# Patient Record
Sex: FEMALE
[De-identification: names, ages, dates, MRNs, and addresses within clinical notes are randomized per-mention and may not be internally consistent; named-entity substitution may affect disease eponyms.]

---

## 2018-01-03 ENCOUNTER — HOSPITAL ENCOUNTER (OUTPATIENT)
Dept: HOSPITAL 92 - MRI | Age: 63
Discharge: HOME | End: 2018-01-03
Payer: MEDICARE

## 2018-01-03 DIAGNOSIS — M47.896: ICD-10-CM

## 2018-01-03 DIAGNOSIS — H53.9: ICD-10-CM

## 2018-01-03 DIAGNOSIS — M50.30: ICD-10-CM

## 2018-01-03 DIAGNOSIS — G93.89: ICD-10-CM

## 2018-01-03 DIAGNOSIS — R51: ICD-10-CM

## 2018-01-03 DIAGNOSIS — M54.5: Primary | ICD-10-CM

## 2018-01-03 DIAGNOSIS — M99.81: ICD-10-CM

## 2018-01-03 DIAGNOSIS — M62.81: ICD-10-CM

## 2018-01-03 PROCEDURE — 70551 MRI BRAIN STEM W/O DYE: CPT

## 2018-01-03 PROCEDURE — 72141 MRI NECK SPINE W/O DYE: CPT

## 2018-01-03 PROCEDURE — 72148 MRI LUMBAR SPINE W/O DYE: CPT

## 2018-01-03 NOTE — MRI
LUMBAR SPINE MRI WITHOUT CONTRAST:

 

Clinical history: Bilateral lower extremity weakness

 

FINDINGS: 

Vertebral body heights maintain appropriate alignment. There is mild multilevel Schmorl's node format
ion. No acute marrow edema or subluxation. There is a transitional lumbosacral segment which will be 
deemed as sacralized L5 segment given the presence of iliolumbar ligaments at this level. No evidence
 of acute abnormality within the imaged retroperitoneum. 

 

L5-S1: Rudimentary disc space without significant central canal or neural foraminal stenosis. 

 

L4-5: Minimal narrowing of the central canal due to broad based mild disc osteophyte complex. No hydr
onephrosis. 

 

L3-4: Minimal narrowing of the central canal due to broad based disc osteophyte. There is mild left n
eural foraminal narrowing. No significant right neural foraminal narrowing. 

 

L2-3: Mild narrowing of the central canal with crowding of the traversing left L3 nerve root due to l
eft asymmetric disc osteophyte complex. No high grade foraminal stenosis. 

 

L1-2: No significant central canal or neural foraminal stenosis. 

 

Conus medullaris demonstrates normal morphology, terminating at the T12-L1 level. There is mild multi
level bilateral facet osteoarthritis. 

 

IMPRESSION: 

Mild multilevel degenerative changes at the lumbar spine, as outlined above. 

 

POS: [facility]

## 2018-01-03 NOTE — MRI
CERVICAL SPINE MRI NONCONTRAST:

 

INDICATIONS:

Bilateral upper extremity weakness.  History of remote neck injury.

 

FINDINGS:

The cervical spinal cord demonstrates appropriate caliber and contour.  The craniocervical junction i
s intact.

 

C1-C2:  No evidence of central canal stenosis.

 

C2-C3:  No evidence of central canal stenosis.

 

C3-C4:  There is a mild disk bulge without significant central canal stenosis.  There is mild right f
oraminal stenosis.  No high grade left foraminal narrowing.

 

C4-C5:  No significant central canal narrowing or neural foraminal stenosis.

 

C5-C6:  Mild disk bulge without significant central canal narrowing or neural foraminal stenosis.

 

C6-C7:  Mild broad-based disk bulge with mild narrowing of the right neural foramen.  No significant 
central canal or left foraminal compromise.

 

C7-T1:  No significant central canal or neural foraminal stenosis.

 

Incidental note of small, round T2 hyperintensity within the cervical spinal cord, at the C4 through 
C6 levels, compatible with benign dilatation of the central spinal canal.

 

No significant marrow signal abnormalities.  No compression deformities or evidence of subluxation.

 

IMPRESSION:

Mild disk bulges of the cervical spine with associated mild foraminal narrowing.  No significant cord
 compromise.

 

POS: [facility]

## 2018-01-03 NOTE — MRI
MRI BRAIN NONCONTRAST:

 

HISTORY:

62-year-old female with H53.9 - vision changes; R51 - frequent headaches. 

 

FINDINGS:

The ventricles are normal in size and configuration.  There is no restricted diffusion, midline shift
 or any other mass effect, recent intraaxial hemorrhage, or extraaxial fluid collection.  There are a
 few scattered punctate T2-hyperintensities in the cerebral white matter consistent with mild chronic
 ischemic white matter changes due to mild microvascular atherosclerosis.

 

IMPRESSION:

1. Mild chronic ischemic white matter changes.

2. Otherwise negative.

 

 

carline

 

POS: [facility]

## 2018-01-05 ENCOUNTER — HOSPITAL ENCOUNTER (OUTPATIENT)
Dept: HOSPITAL 9 - MADRAD | Age: 63
Discharge: HOME | End: 2018-01-05
Attending: FAMILY MEDICINE
Payer: MEDICARE

## 2018-01-05 DIAGNOSIS — J20.9: Primary | ICD-10-CM

## 2018-01-05 PROCEDURE — 71046 X-RAY EXAM CHEST 2 VIEWS: CPT

## 2018-01-05 NOTE — RAD
CHEST TWO VIEWS:

 

HISTORY:

Chronic bronchitis.  Evaluate for pneumonia.

 

COMPARISON:

None.

 

FINDINGS:

Two views of the chest show a normal cardiac silhouette.  The pulmonary vessels and pulmonary hilum a
re normal.  The costophrenic angles are clear.  On the PA projection, there is a possible right infra
hilar infiltrate that is difficult to correspond on the lateral projection.  Continued surveillance i
s recommended.  Adequate aeration of the upper lungs.  No pneumothorax or osseous abnormalities.

 

IMPRESSION:

Questionable right perihilar infiltrate.

 

Short-term follow-up radiograph is recommended.

 

POS: [facility]

## 2018-02-08 ENCOUNTER — HOSPITAL ENCOUNTER (OUTPATIENT)
Dept: HOSPITAL 9 - MADLABBHPM | Age: 63
Discharge: HOME | End: 2018-02-08
Attending: FAMILY MEDICINE
Payer: MEDICARE

## 2018-02-08 DIAGNOSIS — J20.9: Primary | ICD-10-CM

## 2018-02-08 DIAGNOSIS — R30.0: ICD-10-CM

## 2018-02-08 PROCEDURE — 36415 COLL VENOUS BLD VENIPUNCTURE: CPT

## 2018-02-08 PROCEDURE — 71046 X-RAY EXAM CHEST 2 VIEWS: CPT

## 2018-02-08 PROCEDURE — 87086 URINE CULTURE/COLONY COUNT: CPT

## 2018-02-08 NOTE — RAD
CHEST 2 VIEWS:

 

COMPARISON: 

1/5/18.

 

HISTORY: 

Bronchitis.

 

FINDINGS: 

Normal cardiac silhouette.  Pulmonary vessels and hilum are normal.  Costophrenic angles are clear.  
No consolidation or mass.  No pneumothorax or osseous abnormalities.

 

IMPRESSION: 

No acute cardiopulmonary process.

 

POS: [facility]

## 2022-12-28 ENCOUNTER — HOSPITAL ENCOUNTER (OUTPATIENT)
Dept: HOSPITAL 9 - MADRAD | Age: 67
Discharge: HOME | End: 2022-12-28
Payer: MEDICARE

## 2022-12-28 DIAGNOSIS — J32.9: ICD-10-CM

## 2022-12-28 DIAGNOSIS — J31.0: ICD-10-CM

## 2022-12-28 DIAGNOSIS — J45.51: Primary | ICD-10-CM

## 2022-12-28 PROCEDURE — 70220 X-RAY EXAM OF SINUSES: CPT

## 2022-12-28 PROCEDURE — 71046 X-RAY EXAM CHEST 2 VIEWS: CPT
